# Patient Record
Sex: MALE | Race: WHITE | URBAN - METROPOLITAN AREA
[De-identification: names, ages, dates, MRNs, and addresses within clinical notes are randomized per-mention and may not be internally consistent; named-entity substitution may affect disease eponyms.]

---

## 2018-11-30 ENCOUNTER — OFFICE VISIT (OUTPATIENT)
Dept: URBAN - METROPOLITAN AREA CLINIC 23 | Facility: CLINIC | Age: 83
End: 2018-11-30
Payer: MEDICARE

## 2018-11-30 DIAGNOSIS — H35.3134 NEXDTVE AGE-REL MCLR DEGN, BI, ADV ATRPC WITH SUBFOVEAL INVL: ICD-10-CM

## 2018-11-30 DIAGNOSIS — H34.8322 TRIBUTARY (BRANCH) RETINAL VEIN OCCLUSION, LEFT EYE, STABLE: Primary | ICD-10-CM

## 2018-11-30 PROCEDURE — 92134 CPTRZ OPH DX IMG PST SGM RTA: CPT | Performed by: OPHTHALMOLOGY

## 2018-11-30 PROCEDURE — 99213 OFFICE O/P EST LOW 20 MIN: CPT | Performed by: OPHTHALMOLOGY

## 2018-11-30 ASSESSMENT — INTRAOCULAR PRESSURE
OD: 12
OS: 19

## 2018-11-30 NOTE — IMPRESSION/PLAN
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 OS. Condition: established, stable. Vision: vision affected. last AV OS 11/22/2016. Plan: Discussed diagnosis in detail with patient. Exam and OCT shows stable, no edema, no bleeding. No treatment is required at this time based on exam and OCT. Recommend observation for now. Advised patient that vision loss appears to be from age related macular changes, are are most likely permanent. Advised patient that Will reassess condition in 4-6 months. OCT shows  no active edema OS - stable.

## 2018-11-30 NOTE — IMPRESSION/PLAN
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 OU. Condition: established, stable. Vision: vision affected. Plan: Discussed diagnosis in detail with patient. No treatment is required at this time. Recommend pt to continue AREDS 2 formula. Wear quality sunglasses and monitor vision at home with [address] [place] [address] Road. Call the office for an immediate appointment if [address] worsens.  
OCT performed today: no active fluid OU

## 2019-05-29 ENCOUNTER — OFFICE VISIT (OUTPATIENT)
Dept: URBAN - METROPOLITAN AREA CLINIC 23 | Facility: CLINIC | Age: 84
End: 2019-05-29
Payer: MEDICARE

## 2019-05-29 PROCEDURE — 92134 CPTRZ OPH DX IMG PST SGM RTA: CPT | Performed by: OPHTHALMOLOGY

## 2019-05-29 PROCEDURE — 99213 OFFICE O/P EST LOW 20 MIN: CPT | Performed by: OPHTHALMOLOGY

## 2019-05-29 ASSESSMENT — INTRAOCULAR PRESSURE
OD: 11
OS: 19

## 2019-05-29 NOTE — IMPRESSION/PLAN
Impression: Tributary (branch) retinal vein occlusion, left eye, stable: B81.2631. OS. Condition: established, stable. Vision: vision affected. last AV OS 11/22/2016. Plan: Discussed diagnosis in detail with patient. Exam and OCT shows stable, no edema, no bleeding. No treatment is required at this time based on exam and OCT. Recommend observation for now. Advised patient that vision loss appears to be from age related macular changes, are are most likely permanent. Advised patient that Will reassess condition in 6 months. OCT shows  no active edema OS - stable.

## 2019-05-29 NOTE — IMPRESSION/PLAN
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 OU. Condition: established, stable. Vision: vision affected. Plan: Discussed diagnosis in detail with patient. No treatment is required at this time. Recommend pt to continue AREDS 2 formula. Wear quality sunglasses and monitor vision at home with [address] [place] [address] Road. Call the office for an immediate appointment if [address] worsens.  
OCT performed today: no active fluid OU

## 2019-12-11 ENCOUNTER — OFFICE VISIT (OUTPATIENT)
Dept: URBAN - METROPOLITAN AREA CLINIC 23 | Facility: CLINIC | Age: 84
End: 2019-12-11
Payer: MEDICARE

## 2019-12-11 PROCEDURE — 99213 OFFICE O/P EST LOW 20 MIN: CPT | Performed by: OPHTHALMOLOGY

## 2019-12-11 PROCEDURE — 92134 CPTRZ OPH DX IMG PST SGM RTA: CPT | Performed by: OPHTHALMOLOGY

## 2019-12-11 ASSESSMENT — INTRAOCULAR PRESSURE
OS: 14
OD: 11

## 2019-12-11 NOTE — IMPRESSION/PLAN
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 OU. Condition: established, stable. Vision: vision affected. Plan: Discussed diagnosis in detail with patient. No treatment is required at this time. Recommend pt to continue AREDS 2 formula. Wear quality sunglasses and monitor vision at home with [address] [place] [address] Road. Call the office for an immediate appointment if [address] worsens.  
OCT performed today: no active fluid OU

## 2019-12-11 NOTE — IMPRESSION/PLAN
Impression: Tributary (branch) retinal vein occlusion, left eye, stable: Q20.4703. OS. Condition: established, stable. Vision: vision affected. last AV OS 11/22/2016. Plan: Discussed diagnosis in detail with patient. Exam and OCT shows stable, no edema, no bleeding. No treatment is required at this time based on exam and OCT. Recommend observation for now. Advised patient that vision loss appears to be from age related macular changes, are are most likely permanent. Advised patient that Will reassess condition in 6 months.  OCT shows minimal Intraretinal cystic change, but decrease CMT OS

## 2020-06-18 ENCOUNTER — OFFICE VISIT (OUTPATIENT)
Dept: URBAN - METROPOLITAN AREA CLINIC 23 | Facility: CLINIC | Age: 85
End: 2020-06-18
Payer: MEDICARE

## 2020-06-18 PROCEDURE — 99213 OFFICE O/P EST LOW 20 MIN: CPT | Performed by: OPHTHALMOLOGY

## 2020-06-18 ASSESSMENT — INTRAOCULAR PRESSURE
OD: 14
OS: 21

## 2020-06-18 NOTE — IMPRESSION/PLAN
Impression: Tributary (branch) retinal vein occlusion, left eye, stable: E38.4775. OS. Condition: established, stable. Vision: vision affected. last AV OS 11/22/2016. Plan: Due to Coronavirus COVID-19 pandemic and National Emergency, deferred Slit Lamp examination. Findings are based on OCT and Optos. OCT shows the macula is stable and Optos shows the retina is stable w/previous laser tx OS. No treatment is require at this time. Recommend a retina follow - up in 6 mos.

## 2020-06-18 NOTE — IMPRESSION/PLAN
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 OU. Condition: established, stable. Vision: vision affected. Plan: Due to Coronavirus COVID-19 pandemic and National Emergency, deferred Slit Lamp examination. Findings are based on OCT and Optos. OCT and Optos shows the macula / retina is stable OU w/previous laser tx OS. No treatment is require at this time. Recommend a retina follow - up in 6 mos. Continue taking the eye vitamins  AREDS 2 formula.

## 2020-12-31 ENCOUNTER — OFFICE VISIT (OUTPATIENT)
Dept: URBAN - METROPOLITAN AREA CLINIC 23 | Facility: CLINIC | Age: 85
End: 2020-12-31
Payer: MEDICARE

## 2020-12-31 PROCEDURE — 99213 OFFICE O/P EST LOW 20 MIN: CPT | Performed by: OPHTHALMOLOGY

## 2020-12-31 PROCEDURE — 92134 CPTRZ OPH DX IMG PST SGM RTA: CPT | Performed by: OPHTHALMOLOGY

## 2020-12-31 ASSESSMENT — INTRAOCULAR PRESSURE
OD: 11
OS: 23

## 2020-12-31 NOTE — IMPRESSION/PLAN
Impression: Tributary (branch) retinal vein occlusion, left eye, stable: G57.2841. OS. Condition: established, stable. Vision: vision affected. last AV OS 11/22/2016. Plan: Due to Coronavirus COVID-19 pandemic and National Emergency, deferred Slit Lamp examination. Findings are based on OCT and Optos. OCT shows the macula is stable, no edema and Optos shows the retina is stable w/previous laser tx OS. No treatment is require at this time. Recommend a retina follow - up in 6 mos.